# Patient Record
(demographics unavailable — no encounter records)

---

## 2025-01-15 NOTE — HISTORY OF PRESENT ILLNESS
[1/10] : the patient reports pain that is 1/10 in severity [Fever] : no fever [Chills] : no chills [Nausea] : no nausea [Clean/Dry/Intact] : clean, dry and intact [Mild] : mild vaginal bleeding [de-identified] : 33-year-old patient presents for postop office  wound management.  Patient with Aquacel dressing on status post  2025

## 2025-01-15 NOTE — END OF VISIT
[TextEntry] : Is a 33-year-old female who presents for  postoperative visit for wound management. Pt s/p c/s 25, no complaints. Aquacel dressing removed , wound C/D/I no oozing or drainage noted. Pt has PP appointment in 3 weeks. Post op incision care d/w pt. This plan was discussed and managed with dr Dago rosado.

## 2025-05-21 NOTE — PHYSICAL EXAM
[Chaperoned Physical Exam] : A chaperone was present in the examining room during all aspects of the physical examination. [MA] : MA [Appropriately responsive] : appropriately responsive [Alert] : alert [No Acute Distress] : no acute distress [No Lymphadenopathy] : no lymphadenopathy [Soft] : soft [Non-tender] : non-tender [Non-distended] : non-distended [No HSM] : No HSM [No Lesions] : no lesions [No Mass] : no mass [Oriented x3] : oriented x3 [Examination Of The Breasts] : a normal appearance [No Masses] : no breast masses were palpable [Labia Majora] : normal [Labia Minora] : normal [Normal] : normal [Anteversion] : anteverted [Uterine Adnexae] : normal